# Patient Record
Sex: FEMALE | Race: WHITE | NOT HISPANIC OR LATINO | Employment: OTHER | URBAN - METROPOLITAN AREA
[De-identification: names, ages, dates, MRNs, and addresses within clinical notes are randomized per-mention and may not be internally consistent; named-entity substitution may affect disease eponyms.]

---

## 2018-03-05 ENCOUNTER — HOME CARE/HOSPICE - HEALTHEAST (OUTPATIENT)
Dept: HOME HEALTH SERVICES | Facility: HOME HEALTH | Age: 48
End: 2018-03-05

## 2018-03-07 ENCOUNTER — HOME CARE/HOSPICE - HEALTHEAST (OUTPATIENT)
Dept: HOME HEALTH SERVICES | Facility: HOME HEALTH | Age: 48
End: 2018-03-07

## 2018-03-08 ENCOUNTER — HOME CARE/HOSPICE - HEALTHEAST (OUTPATIENT)
Dept: HOME HEALTH SERVICES | Facility: HOME HEALTH | Age: 48
End: 2018-03-08

## 2018-03-21 ENCOUNTER — HOME CARE/HOSPICE - HEALTHEAST (OUTPATIENT)
Dept: HOME HEALTH SERVICES | Facility: HOME HEALTH | Age: 48
End: 2018-03-21

## 2018-12-21 ENCOUNTER — RECORDS - HEALTHEAST (OUTPATIENT)
Dept: LAB | Facility: CLINIC | Age: 48
End: 2018-12-21

## 2018-12-21 LAB
ALBUMIN SERPL-MCNC: 2.8 G/DL (ref 3.5–5)
ALP SERPL-CCNC: 55 U/L (ref 45–120)
ALT SERPL W P-5'-P-CCNC: <9 U/L (ref 0–45)
ANION GAP SERPL CALCULATED.3IONS-SCNC: 10 MMOL/L (ref 5–18)
ANION GAP SERPL CALCULATED.3IONS-SCNC: 10 MMOL/L (ref 5–18)
AST SERPL W P-5'-P-CCNC: 22 U/L (ref 0–40)
BILIRUB SERPL-MCNC: 0.3 MG/DL (ref 0–1)
BUN SERPL-MCNC: 13 MG/DL (ref 8–22)
BUN SERPL-MCNC: 13 MG/DL (ref 8–22)
CALCIUM SERPL-MCNC: 8.7 MG/DL (ref 8.5–10.5)
CALCIUM SERPL-MCNC: 8.7 MG/DL (ref 8.5–10.5)
CHLORIDE BLD-SCNC: 99 MMOL/L (ref 98–107)
CHLORIDE BLD-SCNC: 99 MMOL/L (ref 98–107)
CO2 SERPL-SCNC: 21 MMOL/L (ref 22–31)
CO2 SERPL-SCNC: 21 MMOL/L (ref 22–31)
CREAT SERPL-MCNC: 0.58 MG/DL (ref 0.6–1.1)
CREAT SERPL-MCNC: 0.58 MG/DL (ref 0.6–1.1)
GFR SERPL CREATININE-BSD FRML MDRD: >60 ML/MIN/1.73M2
GFR SERPL CREATININE-BSD FRML MDRD: >60 ML/MIN/1.73M2
GLUCOSE BLD-MCNC: 71 MG/DL (ref 70–125)
GLUCOSE BLD-MCNC: 71 MG/DL (ref 70–125)
POTASSIUM BLD-SCNC: 4.3 MMOL/L (ref 3.5–5)
POTASSIUM BLD-SCNC: 4.3 MMOL/L (ref 3.5–5)
PROT SERPL-MCNC: 6.4 G/DL (ref 6–8)
SODIUM SERPL-SCNC: 130 MMOL/L (ref 136–145)
SODIUM SERPL-SCNC: 130 MMOL/L (ref 136–145)

## 2018-12-24 LAB
SPECIMEN STATUS: ABNORMAL
VALPROATE FREE SERPL-MCNC: 26.3 UG/ML (ref 6–20)

## 2020-01-23 ENCOUNTER — RECORDS - HEALTHEAST (OUTPATIENT)
Dept: LAB | Facility: CLINIC | Age: 50
End: 2020-01-23

## 2020-01-23 LAB
ALBUMIN SERPL-MCNC: 2.6 G/DL (ref 3.5–5)
ALP SERPL-CCNC: 75 U/L (ref 45–120)
ALT SERPL W P-5'-P-CCNC: <9 U/L (ref 0–45)
ANION GAP SERPL CALCULATED.3IONS-SCNC: 9 MMOL/L (ref 5–18)
AST SERPL W P-5'-P-CCNC: 17 U/L (ref 0–40)
BILIRUB SERPL-MCNC: 0.3 MG/DL (ref 0–1)
BUN SERPL-MCNC: 17 MG/DL (ref 8–22)
CALCIUM SERPL-MCNC: 8.6 MG/DL (ref 8.5–10.5)
CHLORIDE BLD-SCNC: 98 MMOL/L (ref 98–107)
CO2 SERPL-SCNC: 23 MMOL/L (ref 22–31)
CREAT SERPL-MCNC: 0.59 MG/DL (ref 0.6–1.1)
GFR SERPL CREATININE-BSD FRML MDRD: >60 ML/MIN/1.73M2
GLUCOSE BLD-MCNC: 79 MG/DL (ref 70–125)
POTASSIUM BLD-SCNC: 4.2 MMOL/L (ref 3.5–5)
PROT SERPL-MCNC: 6.6 G/DL (ref 6–8)
SODIUM SERPL-SCNC: 130 MMOL/L (ref 136–145)
VALPROATE SERPL-MCNC: 123.5 UG/ML (ref 50–150)

## 2020-01-24 ENCOUNTER — RECORDS - HEALTHEAST (OUTPATIENT)
Dept: LAB | Facility: CLINIC | Age: 50
End: 2020-01-24

## 2020-01-27 LAB
FERRITIN SERPL-MCNC: 38 NG/ML (ref 10–130)
VIT B12 SERPL-MCNC: 1328 PG/ML (ref 213–816)

## 2020-04-29 ENCOUNTER — RECORDS - HEALTHEAST (OUTPATIENT)
Dept: ADMINISTRATIVE | Facility: OTHER | Age: 50
End: 2020-04-29

## 2020-04-30 ENCOUNTER — RECORDS - HEALTHEAST (OUTPATIENT)
Dept: ADMINISTRATIVE | Facility: OTHER | Age: 50
End: 2020-04-30

## 2020-04-30 ENCOUNTER — HOME CARE/HOSPICE - HEALTHEAST (OUTPATIENT)
Dept: HOME HEALTH SERVICES | Facility: HOME HEALTH | Age: 50
End: 2020-04-30

## 2020-05-02 ENCOUNTER — RECORDS - HEALTHEAST (OUTPATIENT)
Dept: ADMINISTRATIVE | Facility: OTHER | Age: 50
End: 2020-05-02

## 2020-05-06 ENCOUNTER — HOME CARE/HOSPICE - HEALTHEAST (OUTPATIENT)
Dept: HOME HEALTH SERVICES | Facility: HOME HEALTH | Age: 50
End: 2020-05-06

## 2020-05-07 ENCOUNTER — HOME CARE/HOSPICE - HEALTHEAST (OUTPATIENT)
Dept: HOME HEALTH SERVICES | Facility: HOME HEALTH | Age: 50
End: 2020-05-07

## 2020-05-11 ENCOUNTER — HOME CARE/HOSPICE - HEALTHEAST (OUTPATIENT)
Dept: HOME HEALTH SERVICES | Facility: HOME HEALTH | Age: 50
End: 2020-05-11

## 2020-05-11 ENCOUNTER — RECORDS - HEALTHEAST (OUTPATIENT)
Dept: LAB | Facility: CLINIC | Age: 50
End: 2020-05-11

## 2020-05-11 ENCOUNTER — RECORDS - HEALTHEAST (OUTPATIENT)
Dept: LAB | Facility: HOSPITAL | Age: 50
End: 2020-05-11

## 2020-05-11 LAB
BASOPHILS # BLD AUTO: 0 THOU/UL (ref 0–0.2)
BASOPHILS NFR BLD AUTO: 0 % (ref 0–2)
EOSINOPHIL # BLD AUTO: 0 THOU/UL (ref 0–0.4)
EOSINOPHIL NFR BLD AUTO: 0 % (ref 0–6)
ERYTHROCYTE [DISTWIDTH] IN BLOOD BY AUTOMATED COUNT: 23.7 % (ref 11–14.5)
ERYTHROCYTE [DISTWIDTH] IN BLOOD BY AUTOMATED COUNT: 23.9 % (ref 11–14.5)
HCT VFR BLD AUTO: 30.4 % (ref 35–47)
HCT VFR BLD AUTO: 30.6 % (ref 35–47)
HGB BLD-MCNC: 9.5 G/DL (ref 12–16)
HGB BLD-MCNC: 9.6 G/DL (ref 12–16)
LYMPHOCYTES # BLD AUTO: 1 THOU/UL (ref 0.8–4.4)
LYMPHOCYTES NFR BLD AUTO: 29 % (ref 20–40)
MCH RBC QN AUTO: 33.1 PG (ref 27–34)
MCH RBC QN AUTO: 33.6 PG (ref 27–34)
MCHC RBC AUTO-ENTMCNC: 31 G/DL (ref 32–36)
MCHC RBC AUTO-ENTMCNC: 31.6 G/DL (ref 32–36)
MCV RBC AUTO: 106 FL (ref 80–100)
MCV RBC AUTO: 107 FL (ref 80–100)
MONOCYTES # BLD AUTO: 0.2 THOU/UL (ref 0–0.9)
MONOCYTES NFR BLD AUTO: 5 % (ref 2–10)
NEUTROPHILS # BLD AUTO: 2.4 THOU/UL (ref 2–7.7)
NEUTROPHILS NFR BLD AUTO: 66 % (ref 50–70)
PATH REPORT.MICROSCOPIC SPEC OTHER STN: ABNORMAL
PLATELET # BLD AUTO: 137 THOU/UL (ref 140–440)
PLATELET # BLD AUTO: 148 THOU/UL (ref 140–440)
PMV BLD AUTO: 10.1 FL (ref 8.5–12.5)
PMV BLD AUTO: 10.6 FL (ref 8.5–12.5)
RBC # BLD AUTO: 2.86 MILL/UL (ref 3.8–5.4)
RBC # BLD AUTO: 2.87 MILL/UL (ref 3.8–5.4)
WBC: 3.5 THOU/UL (ref 4–11)
WBC: 3.6 THOU/UL (ref 4–11)

## 2020-05-13 ENCOUNTER — HOME CARE/HOSPICE - HEALTHEAST (OUTPATIENT)
Dept: HOME HEALTH SERVICES | Facility: HOME HEALTH | Age: 50
End: 2020-05-13

## 2020-05-15 ENCOUNTER — HOME CARE/HOSPICE - HEALTHEAST (OUTPATIENT)
Dept: HOME HEALTH SERVICES | Facility: HOME HEALTH | Age: 50
End: 2020-05-15

## 2020-05-15 LAB
LAB AP CHARGES (HE HISTORICAL CONVERSION): NORMAL
PATH REPORT.COMMENTS IMP SPEC: NORMAL
PATH REPORT.COMMENTS IMP SPEC: NORMAL
PATH REPORT.FINAL DX SPEC: NORMAL
PATH REPORT.RELEVANT HX SPEC: NORMAL

## 2020-05-18 ENCOUNTER — HOME CARE/HOSPICE - HEALTHEAST (OUTPATIENT)
Dept: HOME HEALTH SERVICES | Facility: HOME HEALTH | Age: 50
End: 2020-05-18

## 2020-05-21 ENCOUNTER — HOME CARE/HOSPICE - HEALTHEAST (OUTPATIENT)
Dept: HOME HEALTH SERVICES | Facility: HOME HEALTH | Age: 50
End: 2020-05-21

## 2020-05-22 ENCOUNTER — HOME CARE/HOSPICE - HEALTHEAST (OUTPATIENT)
Dept: HOME HEALTH SERVICES | Facility: HOME HEALTH | Age: 50
End: 2020-05-22

## 2020-05-27 ENCOUNTER — HOME CARE/HOSPICE - HEALTHEAST (OUTPATIENT)
Dept: HOME HEALTH SERVICES | Facility: HOME HEALTH | Age: 50
End: 2020-05-27

## 2020-05-28 ENCOUNTER — HOME CARE/HOSPICE - HEALTHEAST (OUTPATIENT)
Dept: HOME HEALTH SERVICES | Facility: HOME HEALTH | Age: 50
End: 2020-05-28

## 2020-05-29 ENCOUNTER — HOME CARE/HOSPICE - HEALTHEAST (OUTPATIENT)
Dept: HOME HEALTH SERVICES | Facility: HOME HEALTH | Age: 50
End: 2020-05-29

## 2020-06-01 ENCOUNTER — HOME CARE/HOSPICE - HEALTHEAST (OUTPATIENT)
Dept: HOME HEALTH SERVICES | Facility: HOME HEALTH | Age: 50
End: 2020-06-01

## 2020-06-03 ENCOUNTER — HOME CARE/HOSPICE - HEALTHEAST (OUTPATIENT)
Dept: HOME HEALTH SERVICES | Facility: HOME HEALTH | Age: 50
End: 2020-06-03

## 2020-06-05 ENCOUNTER — HOME CARE/HOSPICE - HEALTHEAST (OUTPATIENT)
Dept: HOME HEALTH SERVICES | Facility: HOME HEALTH | Age: 50
End: 2020-06-05

## 2020-06-11 ENCOUNTER — HOME CARE/HOSPICE - HEALTHEAST (OUTPATIENT)
Dept: HOME HEALTH SERVICES | Facility: HOME HEALTH | Age: 50
End: 2020-06-11

## 2020-06-12 ENCOUNTER — HOME CARE/HOSPICE - HEALTHEAST (OUTPATIENT)
Dept: HOME HEALTH SERVICES | Facility: HOME HEALTH | Age: 50
End: 2020-06-12

## 2020-06-22 ENCOUNTER — HOME CARE/HOSPICE - HEALTHEAST (OUTPATIENT)
Dept: HOME HEALTH SERVICES | Facility: HOME HEALTH | Age: 50
End: 2020-06-22

## 2021-05-27 VITALS — TEMPERATURE: 97.5 F | HEART RATE: 135 BPM | OXYGEN SATURATION: 99 %

## 2021-05-27 VITALS — TEMPERATURE: 97.9 F | OXYGEN SATURATION: 100 % | HEART RATE: 105 BPM

## 2021-05-27 VITALS — HEART RATE: 135 BPM | TEMPERATURE: 97.4 F | OXYGEN SATURATION: 95 %

## 2021-05-27 VITALS — SYSTOLIC BLOOD PRESSURE: 102 MMHG | RESPIRATION RATE: 18 BRPM | TEMPERATURE: 96.9 F | DIASTOLIC BLOOD PRESSURE: 70 MMHG

## 2021-06-04 PROBLEM — F71 MODERATE INTELLECTUAL DISABILITY: Status: ACTIVE | Noted: 2021-06-04

## 2021-06-04 PROBLEM — F91.1 CONDUCT DISORDER, CHILDHOOD-ONSET TYPE: Status: ACTIVE | Noted: 2021-06-04

## 2021-06-04 PROBLEM — G40.109 SYMPTOMATIC LOCALIZATION-RELATED EPILEPSY (H): Status: ACTIVE | Noted: 2021-06-04

## 2021-06-04 PROBLEM — R25.2 SPASTICITY: Status: ACTIVE | Noted: 2021-06-04

## 2021-06-30 SDOH — HEALTH STABILITY: MENTAL HEALTH: HOW MANY DRINKS CONTAINING ALCOHOL DO YOU HAVE ON A TYPICAL DAY WHEN YOU ARE DRINKING?: NOT ASKED

## 2021-06-30 SDOH — HEALTH STABILITY: MENTAL HEALTH: HOW OFTEN DO YOU HAVE SIX OR MORE DRINKS ON ONE OCCASION?: NOT ASKED

## 2021-06-30 SDOH — HEALTH STABILITY: MENTAL HEALTH: HOW OFTEN DO YOU HAVE A DRINK CONTAINING ALCOHOL?: NOT ASKED

## 2021-07-01 ENCOUNTER — VIRTUAL VISIT (OUTPATIENT)
Dept: NEUROLOGY | Facility: CLINIC | Age: 51
End: 2021-07-01
Payer: MEDICARE

## 2021-07-01 VITALS — HEIGHT: 57 IN | BODY MASS INDEX: 24.81 KG/M2 | WEIGHT: 115 LBS

## 2021-07-01 DIAGNOSIS — G40.109 SYMPTOMATIC LOCALIZATION-RELATED EPILEPSY (H): ICD-10-CM

## 2021-07-01 PROCEDURE — 99207 PR NO CHARGE LOS: CPT | Mod: 95 | Performed by: PSYCHIATRY & NEUROLOGY

## 2021-07-01 RX ORDER — ACETAMINOPHEN 500 MG
1000 TABLET ORAL PRN
COMMUNITY

## 2021-07-01 RX ORDER — LACOSAMIDE 150 MG/1
150 TABLET ORAL 2 TIMES DAILY
Qty: 180 TABLET | Refills: 3 | Status: SHIPPED | OUTPATIENT
Start: 2021-07-01 | End: 2021-10-18

## 2021-07-01 RX ORDER — RISPERIDONE 1 MG/1
1 TABLET ORAL 2 TIMES DAILY
COMMUNITY
Start: 2021-06-29

## 2021-07-01 RX ORDER — GABAPENTIN 300 MG/1
900 CAPSULE ORAL 3 TIMES DAILY
COMMUNITY
Start: 2021-04-04

## 2021-07-01 RX ORDER — MULTIPLE VITAMINS W/ MINERALS TAB 9MG-400MCG
1 TAB ORAL DAILY
COMMUNITY

## 2021-07-01 RX ORDER — HYDROXYZINE HYDROCHLORIDE 25 MG/1
TABLET, FILM COATED ORAL 3 TIMES DAILY
COMMUNITY
Start: 2021-06-07

## 2021-07-01 RX ORDER — LANOLIN ALCOHOL/MO/W.PET/CERES
10 CREAM (GRAM) TOPICAL AT BEDTIME
COMMUNITY

## 2021-07-01 RX ORDER — LOPERAMIDE HYDROCHLORIDE 2 MG/1
2 TABLET ORAL PRN
COMMUNITY

## 2021-07-01 RX ORDER — QUETIAPINE FUMARATE 400 MG/1
400 TABLET, FILM COATED ORAL 2 TIMES DAILY
COMMUNITY
Start: 2021-06-17

## 2021-07-01 RX ORDER — SENNOSIDES A AND B 8.6 MG/1
1 TABLET, FILM COATED ORAL 2 TIMES DAILY
COMMUNITY
End: 2022-08-19

## 2021-07-01 RX ORDER — MULTIVIT WITH MINERALS/LUTEIN
TABLET ORAL
COMMUNITY

## 2021-07-01 ASSESSMENT — MIFFLIN-ST. JEOR: SCORE: 1020.52

## 2021-07-01 NOTE — PROGRESS NOTES
[facility] Neurology  [place]    Keri Bennett MRN# [id number]   Age: 51 year old YOB: 1970               Assessment and Plan:   Assessment:   Localization-related epilepsy  developmental delay  Thrombocytopenia resolved with stopping valproic acid    Care limited by significant behavioral outbursts        Plan:   Orders Placed This Encounter   Procedures     Comprehensive metabolic panel     CBC with platelets and differential     I renewed the lacosamide prescription for the coming year.  Family will take her to [facility] for the blood draw when it is feasible (when the patient might tolerate it).  She does have an appointment with the psychiatrist tomorrow, and hopefully he has some insights about managing behaviors.             Chief Complaint/HPI:     I spoke to Queenie's sister for a follow-up visit today.  She has not had any seizures in quite a few years now, at least 15 years as I review the records available.  She has had generalized tonic-clonic seizures in the past.  Her last appointment was in May 2020.  At that time, Queenie's Depakote had been switched to lacosamide when she was found to have severe thrombocytopenia (down to 9000) at [facility].  Follow-up lab work showed platelet counts up to 140,000.  Unfortunately, since stopping the Depakote she has had quite significant behavioral issues.  She will be okay as long as things are going her way but will have significant tantrums with even minor disruptions such as trips to the bathroom.  Her clinic was even unable to get a blood pressure on her, blood draw does not seem feasible.  With these tantrums she can even show some self-injurious behavior.  On the other hand, her sister is glad that she seems more alert and interactive off of the Depakote.  Soon will be going back to a supervised work program.            Past Medical History:    has no past medical history on file.          Past Surgical History:    has no past  surgical history on file.          Social History:     Social History     Tobacco Use     Smoking status: Never Smoker     Smokeless tobacco: Never Used   Substance Use Topics     Alcohol use: Not Currently             Family History:     Family History   Problem Relation Age of Onset     Breast Cancer Mother      Lung Cancer Father                 Allergies:     Allergies   Allergen Reactions     Haldol [Haloperidol] Unknown     Lactose      Other reaction(s): GI Upset  intolerance     Phenobarbital Unknown             Medications:     Current Outpatient Medications:      acetaminophen (TYLENOL) 500 MG tablet, Take 1,000 mg by mouth as needed, Disp: , Rfl:      calcium Citrate-vitamin D 500-400 MG-UNIT CHEW, Take 1 tablet by mouth daily, Disp: , Rfl:      cholecalciferol 50 MCG (2000 UT) CAPS, Take 4,000 Units by mouth, Disp: , Rfl:      gabapentin (NEURONTIN) 300 MG capsule, Take 900 mg by mouth 3 times daily, Disp: , Rfl:      hydrOXYzine (ATARAX) 25 MG tablet, 3 times daily, Disp: , Rfl:      lacosamide (VIMPAT) 150 MG TABS tablet, Take 1 tablet (150 mg) by mouth 2 times daily, Disp: 180 tablet, Rfl: 3     loperamide (IMODIUM A-D) 2 MG tablet, Take 2 mg by mouth as needed, Disp: , Rfl:      melatonin 3 MG tablet, Take 10 mg by mouth At Bedtime, Disp: , Rfl:      multivitamin w/minerals (MULTI-VITAMIN) tablet, Take 1 tablet by mouth daily, Disp: , Rfl:      QUEtiapine (SEROQUEL) 400 MG tablet, 2 times daily, Disp: , Rfl:      risperiDONE (RISPERDAL) 0.5 MG tablet, 2 times daily, Disp: , Rfl:      senna (SENOKOT) 8.6 MG tablet, Take 1 tablet by mouth 2 times daily, Disp: , Rfl:      vitamin C (ASCORBIC ACID) 1000 MG TABS, 1 tab(s), Disp: , Rfl:               Physical Exam:     Patient's sister reports that she is awake and alert, agitated at times  The patient is basically nonverbal, no evaluation over the telephone is feasible.      Anand Rodrigues MD

## 2021-07-01 NOTE — NURSING NOTE
Chief Complaint   Patient presents with     Seizures     Annual follow up. Sister states overall she is doing well. Has had increasing behaviors. No seizures. Unable to come into clinic because of behaviors. Unable to get blood draws due to behaviors. They are working with a psychiatrist for behavior meds.     Phone visit     [phone] Tran Finn will assist with phone call.     Ara Lux LPN on 7/1/2021 at 1:57 PM

## 2021-07-01 NOTE — LETTER
7/1/2021         RE: Keri Bennett  [address]  [facility] [address]        Dear Colleague,    Thank you for referring your patient, Keri Bennett, to the [facility] NEUROLOGY CLINIC [place]. Please see a copy of my visit note below.    [facility] Neurology  [place]    eKri Bennett MRN# [id number]   Age: 51 year old YOB: 1970               Assessment and Plan:   Assessment:   Localization-related epilepsy  developmental delay  Thrombocytopenia resolved with stopping valproic acid    Care limited by significant behavioral outbursts        Plan:   Orders Placed This Encounter   Procedures     Comprehensive metabolic panel     CBC with platelets and differential     I renewed the lacosamide prescription for the coming year.  Family will take her to [facility] for the blood draw when it is feasible (when the patient might tolerate it).  She does have an appointment with the psychiatrist tomorrow, and hopefully he has some insights about managing behaviors.             Chief Complaint/HPI:     I spoke to Queenie's sister for a follow-up visit today.  She has not had any seizures in quite a few years now, at least 15 years as I review the records available.  She has had generalized tonic-clonic seizures in the past.  Her last appointment was in May 2020.  At that time, Queenie's Depakote had been switched to lacosamide when she was found to have severe thrombocytopenia (down to 9000) at [facility].  Follow-up lab work showed platelet counts up to 140,000.  Unfortunately, since stopping the Depakote she has had quite significant behavioral issues.  She will be okay as long as things are going her way but will have significant tantrums with even minor disruptions such as trips to the bathroom.  Her clinic was even unable to get a blood pressure on her, blood draw does not seem feasible.  With these tantrums she can even show some self-injurious behavior.  On the other hand, her  sister is glad that she seems more alert and interactive off of the Depakote.  Soon will be going back to a supervised work program.            Past Medical History:    has no past medical history on file.          Past Surgical History:    has no past surgical history on file.          Social History:     Social History     Tobacco Use     Smoking status: Never Smoker     Smokeless tobacco: Never Used   Substance Use Topics     Alcohol use: Not Currently             Family History:     Family History   Problem Relation Age of Onset     Breast Cancer Mother      Lung Cancer Father                 Allergies:     Allergies   Allergen Reactions     Haldol [Haloperidol] Unknown     Lactose      Other reaction(s): GI Upset  intolerance     Phenobarbital Unknown             Medications:     Current Outpatient Medications:      acetaminophen (TYLENOL) 500 MG tablet, Take 1,000 mg by mouth as needed, Disp: , Rfl:      calcium Citrate-vitamin D 500-400 MG-UNIT CHEW, Take 1 tablet by mouth daily, Disp: , Rfl:      cholecalciferol 50 MCG (2000 UT) CAPS, Take 4,000 Units by mouth, Disp: , Rfl:      gabapentin (NEURONTIN) 300 MG capsule, Take 900 mg by mouth 3 times daily, Disp: , Rfl:      hydrOXYzine (ATARAX) 25 MG tablet, 3 times daily, Disp: , Rfl:      lacosamide (VIMPAT) 150 MG TABS tablet, Take 1 tablet (150 mg) by mouth 2 times daily, Disp: 180 tablet, Rfl: 3     loperamide (IMODIUM A-D) 2 MG tablet, Take 2 mg by mouth as needed, Disp: , Rfl:      melatonin 3 MG tablet, Take 10 mg by mouth At Bedtime, Disp: , Rfl:      multivitamin w/minerals (MULTI-VITAMIN) tablet, Take 1 tablet by mouth daily, Disp: , Rfl:      QUEtiapine (SEROQUEL) 400 MG tablet, 2 times daily, Disp: , Rfl:      risperiDONE (RISPERDAL) 0.5 MG tablet, 2 times daily, Disp: , Rfl:      senna (SENOKOT) 8.6 MG tablet, Take 1 tablet by mouth 2 times daily, Disp: , Rfl:      vitamin C (ASCORBIC ACID) 1000 MG TABS, 1 tab(s), Disp: , Rfl:                Physical Exam:     Patient's sister reports that she is awake and alert, agitated at times  The patient is basically nonverbal, no evaluation over the telephone is feasible.      Anand Rodrigues MD            Again, thank you for allowing me to participate in the care of your patient.        Sincerely,        Anand Rodrigues MD

## 2021-10-18 ENCOUNTER — TELEPHONE (OUTPATIENT)
Dept: NEUROLOGY | Facility: CLINIC | Age: 51
End: 2021-10-18

## 2021-10-18 DIAGNOSIS — G40.109 SYMPTOMATIC LOCALIZATION-RELATED EPILEPSY (H): Primary | ICD-10-CM

## 2021-10-18 RX ORDER — LACOSAMIDE 200 MG/1
200 TABLET ORAL 2 TIMES DAILY
Qty: 60 TABLET | Refills: 5 | Status: SHIPPED | OUTPATIENT
Start: 2021-10-18 | End: 2022-04-14

## 2021-10-18 NOTE — TELEPHONE ENCOUNTER
Spoke with Tran and informed her of Dr. Loera's recommendations  They are out of town right now and unable to do labs immediately- They will do labs when they return from being out of town and  the rx from [facility]  Diana Strickland CMA on 10/18/2021 at 4:20 PM

## 2021-10-18 NOTE — TELEPHONE ENCOUNTER
Check Vimpat level and comprehensive metabolic panel.  Increase Vimpat to 200 mg twice daily.  I have sent a new prescription to patient's pharmacy

## 2021-10-18 NOTE — TELEPHONE ENCOUNTER
Pt's sister and POA  Tran called to report two break through seizures today. Pt was at her day program and staff witnessed 2 episodes. 1st one lasted 2 minutes, where pt was unresponsive to commands and her eyes rolled back. She was sitting at her desk. The 2nd was the same, but lasted only a minute.  Sister is out of town and pt has a PCA staying with her. Sister will check if any meds were missed. Pt hasn't had this type of episode in a very long time. Quite uncommon. I did advise Tran to get pt on a schedule to see another provider. Pt saw Dr Rodrigues in July. [phone]

## 2021-10-22 ENCOUNTER — LAB (OUTPATIENT)
Dept: LAB | Facility: CLINIC | Age: 51
End: 2021-10-22
Payer: MEDICARE

## 2021-10-22 DIAGNOSIS — G40.109 SYMPTOMATIC LOCALIZATION-RELATED EPILEPSY (H): ICD-10-CM

## 2021-10-22 LAB
ALBUMIN SERPL-MCNC: 3.5 G/DL (ref 3.5–5)
ALP SERPL-CCNC: 111 U/L (ref 45–120)
ALT SERPL W P-5'-P-CCNC: 24 U/L (ref 0–45)
ANION GAP SERPL CALCULATED.3IONS-SCNC: 11 MMOL/L (ref 5–18)
AST SERPL W P-5'-P-CCNC: 28 U/L (ref 0–40)
BASOPHILS # BLD AUTO: 0 10E3/UL (ref 0–0.2)
BASOPHILS NFR BLD AUTO: 1 %
BILIRUB SERPL-MCNC: 0.4 MG/DL (ref 0–1)
BUN SERPL-MCNC: 26 MG/DL (ref 8–22)
CALCIUM SERPL-MCNC: 9.8 MG/DL (ref 8.5–10.5)
CHLORIDE BLD-SCNC: 104 MMOL/L (ref 98–107)
CO2 SERPL-SCNC: 24 MMOL/L (ref 22–31)
CREAT SERPL-MCNC: 0.63 MG/DL (ref 0.6–1.1)
EOSINOPHIL # BLD AUTO: 0.1 10E3/UL (ref 0–0.7)
EOSINOPHIL NFR BLD AUTO: 1 %
ERYTHROCYTE [DISTWIDTH] IN BLOOD BY AUTOMATED COUNT: 15.9 % (ref 10–15)
GFR SERPL CREATININE-BSD FRML MDRD: >90 ML/MIN/1.73M2
GLUCOSE BLD-MCNC: 93 MG/DL (ref 70–125)
HCT VFR BLD AUTO: 38.2 % (ref 35–47)
HGB BLD-MCNC: 12.3 G/DL (ref 11.7–15.7)
IMM GRANULOCYTES # BLD: 0 10E3/UL
IMM GRANULOCYTES NFR BLD: 1 %
LYMPHOCYTES # BLD AUTO: 2 10E3/UL (ref 0.8–5.3)
LYMPHOCYTES NFR BLD AUTO: 31 %
MCH RBC QN AUTO: 28.7 PG (ref 26.5–33)
MCHC RBC AUTO-ENTMCNC: 32.2 G/DL (ref 31.5–36.5)
MCV RBC AUTO: 89 FL (ref 78–100)
MONOCYTES # BLD AUTO: 0.7 10E3/UL (ref 0–1.3)
MONOCYTES NFR BLD AUTO: 11 %
NEUTROPHILS # BLD AUTO: 3.5 10E3/UL (ref 1.6–8.3)
NEUTROPHILS NFR BLD AUTO: 55 %
NRBC # BLD AUTO: 0 10E3/UL
NRBC BLD AUTO-RTO: 0 /100
PLATELET # BLD AUTO: 190 10E3/UL (ref 150–450)
POTASSIUM BLD-SCNC: 4.2 MMOL/L (ref 3.5–5)
PROT SERPL-MCNC: 7 G/DL (ref 6–8)
RBC # BLD AUTO: 4.29 10E6/UL (ref 3.8–5.2)
SODIUM SERPL-SCNC: 139 MMOL/L (ref 136–145)
WBC # BLD AUTO: 6.3 10E3/UL (ref 4–11)

## 2021-10-22 PROCEDURE — 85025 COMPLETE CBC W/AUTO DIFF WBC: CPT

## 2021-10-22 PROCEDURE — 80235 DRUG ASSAY LACOSAMIDE: CPT

## 2021-10-22 PROCEDURE — 82040 ASSAY OF SERUM ALBUMIN: CPT

## 2021-10-22 PROCEDURE — 36415 COLL VENOUS BLD VENIPUNCTURE: CPT

## 2021-10-23 LAB — LACOSAMIDE SERPL-MCNC: 17 UG/ML

## 2021-10-24 NOTE — TELEPHONE ENCOUNTER
Vimpat level is elevated. I had increased the dose to 200 mg BID. If patient is tolerating that dose continue 200 mg BID. Repeat TROUGH Vimpat level. See orders

## 2021-10-26 NOTE — TELEPHONE ENCOUNTER
Spoke to Tran, relayed Dr. Loera's recommendations below.  Tran verbalizes understanding.  Pt will have lab drawn at [facility].    Ara Lux LPN on 10/26/2021 at 9:12 AM

## 2021-11-01 ENCOUNTER — LAB (OUTPATIENT)
Dept: LAB | Facility: CLINIC | Age: 51
End: 2021-11-01
Payer: MEDICARE

## 2021-11-01 DIAGNOSIS — G40.109 SYMPTOMATIC LOCALIZATION-RELATED EPILEPSY (H): ICD-10-CM

## 2021-11-01 PROCEDURE — 36415 COLL VENOUS BLD VENIPUNCTURE: CPT

## 2021-11-01 PROCEDURE — 80235 DRUG ASSAY LACOSAMIDE: CPT

## 2021-11-03 LAB — LACOSAMIDE SERPL-MCNC: 14.1 UG/ML

## 2021-11-04 NOTE — TELEPHONE ENCOUNTER
Tran informed and verbalized understanding. She will monitor Keri for the next month or 2 and if she notices increased sedation she will let us know. No further questions at this time  Diana Strickland, RAVINDER on 11/4/2021 at 2:49 PM

## 2021-11-04 NOTE — TELEPHONE ENCOUNTER
Repeat Vimpat trough level is 14.1.  This is still borderline elevated.  Currently she is on 200 mg twice daily Vimpat.  If she is tolerating this dose well no need to make any changes.  If she appears more sedated we can decrease the dose somewhat but that increases the risk of breakthrough seizures again.

## 2022-04-14 DIAGNOSIS — G40.109 SYMPTOMATIC LOCALIZATION-RELATED EPILEPSY (H): ICD-10-CM

## 2022-04-14 RX ORDER — LACOSAMIDE 200 MG/1
200 TABLET ORAL 2 TIMES DAILY
Qty: 60 TABLET | Refills: 5 | Status: SHIPPED | OUTPATIENT
Start: 2022-04-14 | End: 2022-08-19

## 2022-04-14 NOTE — TELEPHONE ENCOUNTER
Pt has a August Appt and will need refills of Vimpat 200 mg bid until she can be seen. [facility] [place]

## 2022-08-19 ENCOUNTER — OFFICE VISIT (OUTPATIENT)
Dept: NEUROLOGY | Facility: CLINIC | Age: 52
End: 2022-08-19
Payer: MEDICARE

## 2022-08-19 VITALS
DIASTOLIC BLOOD PRESSURE: 74 MMHG | BODY MASS INDEX: 25.46 KG/M2 | HEIGHT: 57 IN | HEART RATE: 105 BPM | WEIGHT: 118 LBS | SYSTOLIC BLOOD PRESSURE: 125 MMHG

## 2022-08-19 DIAGNOSIS — G40.109 SYMPTOMATIC LOCALIZATION-RELATED EPILEPSY (H): Primary | ICD-10-CM

## 2022-08-19 PROBLEM — Z98.2 VP (VENTRICULOPERITONEAL) SHUNT STATUS: Status: ACTIVE | Noted: 2017-08-16

## 2022-08-19 PROBLEM — F79 INTELLECTUAL DISABILITY: Status: ACTIVE | Noted: 2017-08-16

## 2022-08-19 PROCEDURE — 99215 OFFICE O/P EST HI 40 MIN: CPT | Performed by: PSYCHIATRY & NEUROLOGY

## 2022-08-19 RX ORDER — UBIDECARENONE 100 MG
100 CAPSULE ORAL DAILY
COMMUNITY

## 2022-08-19 RX ORDER — LACOSAMIDE 200 MG/1
200 TABLET ORAL 2 TIMES DAILY
Qty: 180 TABLET | Refills: 3 | Status: SHIPPED | OUTPATIENT
Start: 2022-08-19 | End: 2023-10-10

## 2022-08-19 NOTE — LETTER
2022         RE: Keri Bennett  [address]  [place] [address]        Dear Colleague,    Thank you for referring your patient, Keri Bennett, to the [facility] NEUROLOGY CLINIC [place]. Please see a copy of my visit note below.    NEUROLOGY FOLLOW UP VISIT  NOTE       [facility] NEUROLOGY [place]  [address] [place] [address]  Tel: [phone]  Fax: [phone]  [email or web address]     Keri Bennett,  1970, MRN [id number]  PCP: Kyle Rico  Date: 2022      ASSESSMENT & PLAN     Visit Diagnosis  1. Symptomatic localization-related epilepsy (H)     Symptomatic localization related epilepsy  52-year-old female with history of static encephalopathy,  shunt, chronic back problem who is been followed in our clinic for symptomatic localization-related epilepsy.  Previously she was on Depakote that had to be switched to Vimpat due to thrombocytopenia.  Recently she had seizure-like activity although it is not confirmed if she actually had a seizure and dose of Vimpat was increased.  I have recommended:    1.  Continue Vimpat 200 mg twice daily.  I refilled her prescription, given 90-day supply with 3 refills  2.  Check Vimpat level, gabapentin level and comprehensive metabolic panel  3.  Follow-up in 1 year, earlier if there is any problem    Thank you again for this referral, please feel free to contact me if you have any questions.    Parveen Loera MD  [facility]  (Formerly, [facility]     HISTORY OF PRESENT ILLNESS     Patient is a 52-year-old female with history of static encephalopathy,  shunt, chronic back problem and behavioral issues who is been followed in our clinic for seizures.  Previously she was followed by Dr. Anand Rodrigues and is a new patient for me.    Patient was initially seen in our clinic in  for seizures.  Initially she was on Tegretol that was switched  to Depakote but in 2020 she was admitted to [facility] for significant thrombocytopenia and was switched to Vimpat.  Her platelets did improve but she had some behavioral issues with temper tantrum.  In addition she was kept on Seroquel.  She was at work and they reported patient possibly had a seizure.  Dose of Vimpat was increased to 200 mg twice daily.  Family members are not sure if she did have a seizure.  According to them behavioral issues that started after she was taken off Depakote due to thrombocytopenia have settled down.  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    Surgical History:  She  has no past surgical history on file.     SOCIAL HISTORY     Reviewed, and she  reports that she has never smoked. She has never used smokeless tobacco. She reports previous alcohol use.     FAMILY HISTORY     Reviewed, and family history includes Breast Cancer in her mother; Lung Cancer in her father.     ALLERGIES     Allergies   Allergen Reactions     Haldol [Haloperidol] Unknown     Lactose      Other reaction(s): GI Upset  intolerance     Phenobarbital Unknown         REVIEW OF SYSTEMS     A 12 point review of system was performed and was negative except as outlined in the history of present illness.     HOME MEDICATIONS     Current Outpatient Rx   Medication Sig Dispense Refill     acetaminophen (TYLENOL) 500 MG tablet Take 1,000 mg by mouth as needed       calcium Citrate-vitamin D 500-400 MG-UNIT CHEW Take 1 tablet by mouth daily       cholecalciferol 50 MCG (2000 UT) CAPS Take 4,000 Units  "by mouth       co-enzyme Q-10 100 MG CAPS capsule Take 100 mg by mouth daily       gabapentin (NEURONTIN) 300 MG capsule Take 900 mg by mouth 3 times daily       hydrOXYzine (ATARAX) 25 MG tablet 3 times daily       lacosamide (VIMPAT) 200 MG TABS tablet Take 1 tablet (200 mg) by mouth 2 times daily 180 tablet 3     loperamide (IMODIUM A-D) 2 MG tablet Take 2 mg by mouth as needed       melatonin 3 MG tablet Take 10 mg by mouth At Bedtime       multivitamin w/minerals (THERA-VIT-M) tablet Take 1 tablet by mouth daily       QUEtiapine (SEROQUEL) 400 MG tablet Take 400 mg by mouth 2 times daily       risperiDONE (RISPERDAL) 1 MG tablet Take 1 mg by mouth 2 times daily       UNABLE TO FIND MEDICATION NAME: Mood Calm       vitamin C (ASCORBIC ACID) 1000 MG TABS 1 tab(s)           PHYSICAL EXAM     Vital signs  /74 (BP Location: Right arm, Patient Position: Sitting)   Pulse 105   Ht 1.448 m (4' 9\")   Wt 53.5 kg (118 lb)   BMI 25.53 kg/m      Weight:   118 lbs 0 oz    Patient is alert and oriented she is able to tell me her name and able to carry on simple conversation pupil equal round reactive face symmetrical moves upper extremities no movement in the lower extremity reflexes absent toes equivocal.  She did not cooperate for cerebellar testing.  She is wheelchair-bound     PERTINENT DIAGNOSTIC STUDIES     Following studies were reviewed:     CT BRAIN 5/27/2013  No change from 6/12/2012. 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   Ventricular  shunt catheter in good position on the right. Areas of encephalomalacia in the  right cerebral hemisphere as noted above.    MRI BRAIN 12/6/2010       PERTINENT LABS  Following labs were reviewed:  No visits with results within 3 Month(s) from this visit.   Latest known visit with results is:   Lab on 11/01/2021   Component Date Value     Lacosamide 11/01/2021 14.1 (A)         Total time spent for face to face visit, reviewing labs/imaging studies, counseling and " coordination of care was: 45 Minutes spent on the date of the encounter doing chart review, review of outside records, review of test results, interpretation of tests, patient visit and documentation       This note was dictated using voice recognition software.  Any grammatical or context distortions are unintentional and inherent to the software.    Orders Placed This Encounter   Procedures     Lacosamide Level     Comprehensive metabolic panel     Gabapentin Level      New Prescriptions    No medications on file     Modified Medications    Modified Medication Previous Medication    LACOSAMIDE (VIMPAT) 200 MG TABS TABLET lacosamide (VIMPAT) 200 MG TABS tablet       Take 1 tablet (200 mg) by mouth 2 times daily    Take 1 tablet (200 mg) by mouth 2 times daily                     Again, thank you for allowing me to participate in the care of your patient.        Sincerely,        Parveen Loera MD

## 2022-08-19 NOTE — PROGRESS NOTES
NEUROLOGY FOLLOW UP VISIT  NOTE       [facility] NEUROLOGY [place]  [address] [place] [address]  Tel: [phone]  Fax: [phone]  [email or web address]     Keri Bennett,  1970, MRN [id number]  PCP: Kyle Rico  Date: 2022      ASSESSMENT & PLAN     Visit Diagnosis  1. Symptomatic localization-related epilepsy (H)     Symptomatic localization related epilepsy  52-year-old female with history of static encephalopathy,  shunt, chronic back problem who is been followed in our clinic for symptomatic localization-related epilepsy.  Previously she was on Depakote that had to be switched to Vimpat due to thrombocytopenia.  Recently she had seizure-like activity although it is not confirmed if she actually had a seizure and dose of Vimpat was increased.  I have recommended:    1.  Continue Vimpat 200 mg twice daily.  I refilled her prescription, given 90-day supply with 3 refills  2.  Check Vimpat level, gabapentin level and comprehensive metabolic panel  3.  Follow-up in 1 year, earlier if there is any problem    Thank you again for this referral, please feel free to contact me if you have any questions.    Parveen Loera MD  [facility]  (Formerly, [facility]     HISTORY OF PRESENT ILLNESS     Patient is a 52-year-old female with history of static encephalopathy,  shunt, chronic back problem and behavioral issues who is been followed in our clinic for seizures.  Previously she was followed by Dr. Anand Rodrigues and is a new patient for me.    Patient was initially seen in our clinic in  for seizures.  Initially she was on Tegretol that was switched to Depakote but in  she was admitted to [facility] for significant thrombocytopenia and was switched to Vimpat.  Her platelets did improve but she had some behavioral issues with temper tantrum.  In addition she was kept on Seroquel.  She was at work  and they reported patient possibly had a seizure.  Dose of Vimpat was increased to 200 mg twice daily.  Family members are not sure if she did have a seizure.  According to them behavioral issues that started after she was taken off Depakote due to thrombocytopenia have settled down.  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    Surgical History:  She  has no past surgical history on file.     SOCIAL HISTORY     Reviewed, and she  reports that she has never smoked. She has never used smokeless tobacco. She reports previous alcohol use.     FAMILY HISTORY     Reviewed, and family history includes Breast Cancer in her mother; Lung Cancer in her father.     ALLERGIES     Allergies   Allergen Reactions     Haldol [Haloperidol] Unknown     Lactose      Other reaction(s): GI Upset  intolerance     Phenobarbital Unknown         REVIEW OF SYSTEMS     A 12 point review of system was performed and was negative except as outlined in the history of present illness.     HOME MEDICATIONS     Current Outpatient Rx   Medication Sig Dispense Refill     acetaminophen (TYLENOL) 500 MG tablet Take 1,000 mg by mouth as needed       calcium Citrate-vitamin D 500-400 MG-UNIT CHEW Take 1 tablet by mouth daily       cholecalciferol 50 MCG (2000 UT) CAPS Take 4,000 Units by mouth       co-enzyme Q-10 100 MG CAPS capsule Take 100 mg by mouth daily       gabapentin (NEURONTIN) 300 MG capsule Take 900 mg by mouth 3 times daily       hydrOXYzine (ATARAX) 25 MG tablet 3 times daily       lacosamide (VIMPAT) 200 MG TABS tablet Take 1  "tablet (200 mg) by mouth 2 times daily 180 tablet 3     loperamide (IMODIUM A-D) 2 MG tablet Take 2 mg by mouth as needed       melatonin 3 MG tablet Take 10 mg by mouth At Bedtime       multivitamin w/minerals (THERA-VIT-M) tablet Take 1 tablet by mouth daily       QUEtiapine (SEROQUEL) 400 MG tablet Take 400 mg by mouth 2 times daily       risperiDONE (RISPERDAL) 1 MG tablet Take 1 mg by mouth 2 times daily       UNABLE TO FIND MEDICATION NAME: Mood Calm       vitamin C (ASCORBIC ACID) 1000 MG TABS 1 tab(s)           PHYSICAL EXAM     Vital signs  /74 (BP Location: Right arm, Patient Position: Sitting)   Pulse 105   Ht 1.448 m (4' 9\")   Wt 53.5 kg (118 lb)   BMI 25.53 kg/m      Weight:   118 lbs 0 oz    Patient is alert and oriented she is able to tell me her name and able to carry on simple conversation pupil equal round reactive face symmetrical moves upper extremities no movement in the lower extremity reflexes absent toes equivocal.  She did not cooperate for cerebellar testing.  She is wheelchair-bound     PERTINENT DIAGNOSTIC STUDIES     Following studies were reviewed:     CT BRAIN 5/27/2013  No change from 6/12/2012. 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   Ventricular  shunt catheter in good position on the right. Areas of encephalomalacia in the  right cerebral hemisphere as noted above.    MRI BRAIN 12/6/2010       PERTINENT LABS  Following labs were reviewed:  No visits with results within 3 Month(s) from this visit.   Latest known visit with results is:   Lab on 11/01/2021   Component Date Value     Lacosamide 11/01/2021 14.1 (A)         Total time spent for face to face visit, reviewing labs/imaging studies, counseling and coordination of care was: 45 Minutes spent on the date of the encounter doing chart review, review of outside records, review of test results, interpretation of tests, patient visit and documentation       This note was dictated using voice recognition software.  Any " grammatical or context distortions are unintentional and inherent to the software.    Orders Placed This Encounter   Procedures     Lacosamide Level     Comprehensive metabolic panel     Gabapentin Level      New Prescriptions    No medications on file     Modified Medications    Modified Medication Previous Medication    LACOSAMIDE (VIMPAT) 200 MG TABS TABLET lacosamide (VIMPAT) 200 MG TABS tablet       Take 1 tablet (200 mg) by mouth 2 times daily    Take 1 tablet (200 mg) by mouth 2 times daily

## 2022-08-19 NOTE — NURSING NOTE
Chief Complaint   Patient presents with     Seizures     Annual follow up- transfer care from Dr Rodrigues. Staff reports no seizures      Diana Strickland CMA on 8/19/2022 at 9:51 AM

## 2022-12-02 ENCOUNTER — TELEPHONE (OUTPATIENT)
Dept: NEUROLOGY | Facility: CLINIC | Age: 52
End: 2022-12-02

## 2022-12-02 NOTE — TELEPHONE ENCOUNTER
Spoke to Tran (sister) that the fax number for [facility] was an error.     Tran will come over to [facility] clinic to grab the lab orders and bring it to [facility] on the day of lab appt.     Will place orders at .    Amando Ambrose 12/2/2022 11:49 AM

## 2022-12-02 NOTE — TELEPHONE ENCOUNTER
Health Call Center    Phone Message    May a detailed message be left on voicemail: yes     Reason for Call:     Tran pt's sister called states that patient is scheduled to get labs done at her PMD's office [facility] on Monday 12/5/2022 - Tran requesting for care team to fax lab orders to [facility] please so she can just get all her labs done there.     [facility] P [phone] / Fax# [phone]         Action Taken: Other: [facility]    Travel Screening: Not Applicable

## 2022-12-05 ENCOUNTER — LAB REQUISITION (OUTPATIENT)
Dept: LAB | Facility: CLINIC | Age: 52
End: 2022-12-05
Payer: MEDICARE

## 2022-12-05 DIAGNOSIS — F07.9 UNSPECIFIED PERSONALITY AND BEHAVIORAL DISORDER DUE TO KNOWN PHYSIOLOGICAL CONDITION: ICD-10-CM

## 2022-12-05 DIAGNOSIS — E55.9 VITAMIN D DEFICIENCY, UNSPECIFIED: ICD-10-CM

## 2022-12-05 DIAGNOSIS — G40.309 GENERALIZED IDIOPATHIC EPILEPSY AND EPILEPTIC SYNDROMES, NOT INTRACTABLE, WITHOUT STATUS EPILEPTICUS (H): ICD-10-CM

## 2022-12-05 DIAGNOSIS — Z13.6 ENCOUNTER FOR SCREENING FOR CARDIOVASCULAR DISORDERS: ICD-10-CM

## 2022-12-05 LAB
ALBUMIN SERPL BCG-MCNC: 4.1 G/DL (ref 3.5–5.2)
ALP SERPL-CCNC: 122 U/L (ref 35–104)
ALT SERPL W P-5'-P-CCNC: 28 U/L (ref 10–35)
ANION GAP SERPL CALCULATED.3IONS-SCNC: 15 MMOL/L (ref 7–15)
AST SERPL W P-5'-P-CCNC: 21 U/L (ref 10–35)
BILIRUB SERPL-MCNC: 0.2 MG/DL
BUN SERPL-MCNC: 14.5 MG/DL (ref 6–20)
CALCIUM SERPL-MCNC: 9.6 MG/DL (ref 8.6–10)
CHLORIDE SERPL-SCNC: 101 MMOL/L (ref 98–107)
CHOLEST SERPL-MCNC: 217 MG/DL
CREAT SERPL-MCNC: 0.51 MG/DL (ref 0.51–0.95)
DEPRECATED HCO3 PLAS-SCNC: 22 MMOL/L (ref 22–29)
GFR SERPL CREATININE-BSD FRML MDRD: >90 ML/MIN/1.73M2
GLUCOSE SERPL-MCNC: 106 MG/DL (ref 70–99)
HDLC SERPL-MCNC: 62 MG/DL
LDLC SERPL CALC-MCNC: 131 MG/DL
NONHDLC SERPL-MCNC: 155 MG/DL
POTASSIUM SERPL-SCNC: 4 MMOL/L (ref 3.4–5.3)
PROT SERPL-MCNC: 6.8 G/DL (ref 6.4–8.3)
SODIUM SERPL-SCNC: 138 MMOL/L (ref 136–145)
TRIGL SERPL-MCNC: 118 MG/DL

## 2022-12-05 PROCEDURE — 82306 VITAMIN D 25 HYDROXY: CPT | Mod: ORL | Performed by: FAMILY MEDICINE

## 2022-12-05 PROCEDURE — 80061 LIPID PANEL: CPT | Mod: ORL | Performed by: FAMILY MEDICINE

## 2022-12-05 PROCEDURE — 80235 DRUG ASSAY LACOSAMIDE: CPT | Mod: ORL | Performed by: FAMILY MEDICINE

## 2022-12-05 PROCEDURE — 80053 COMPREHEN METABOLIC PANEL: CPT | Mod: ORL | Performed by: FAMILY MEDICINE

## 2022-12-05 PROCEDURE — 80171 DRUG SCREEN QUANT GABAPENTIN: CPT | Mod: ORL | Performed by: FAMILY MEDICINE

## 2022-12-07 LAB — DEPRECATED CALCIDIOL+CALCIFEROL SERPL-MC: >155 UG/L (ref 20–75)

## 2022-12-08 LAB — GABAPENTIN SERPLBLD-MCNC: 10.2 UG/ML

## 2022-12-11 LAB — LACOSAMIDE SERPL-MCNC: 20.8 UG/ML

## 2022-12-13 ENCOUNTER — TELEPHONE (OUTPATIENT)
Dept: NEUROLOGY | Facility: CLINIC | Age: 52
End: 2022-12-13

## 2022-12-13 NOTE — TELEPHONE ENCOUNTER
[facility]    Phone Message    May a detailed message be left on voicemail: yes     Reason for Call: Requesting Results   Name/type of test: Lacosamide Level  Date of test: 12/5/22  Was test done at a location other than [facility] (Please fill in the location if not [facility])?: Yes [facility]  Please call Pt sister Tran back at [phone] to discuss.    Action Taken: Message routed to:  Other: [facility]    Travel Screening: Not Applicable

## 2022-12-15 NOTE — TELEPHONE ENCOUNTER
Returned call to patients sister (legal guardian). We discussed result letter that was mailed 12/13. She verbalizes understanding. Pt will continue vimpat and gabapentin as prescribed.     Ravi Chang RN, BSN  [facility]

## 2023-10-07 DIAGNOSIS — G40.109 SYMPTOMATIC LOCALIZATION-RELATED EPILEPSY (H): ICD-10-CM

## 2023-10-07 NOTE — LETTER
10/7/2023        RE: Keri Bennett  [address]  [facility] [address]        Dear Keri,      We recently provided you with medication refills.  Many medications require routine follow-up with your doctor. As our neurologists are booking out several months, please contact us at your earliest convenience at [phone] to avoid any interruptions in your medication refills.     Your prescription(s) have been refilled for 30 days so you may have time for the above noted follow-up. Please call to schedule soon so we can assure you have an appointment before your next refills are needed. If you have already made a follow up appointment, please disregard this letter.       Sincerely,  [facility]

## 2023-10-09 NOTE — TELEPHONE ENCOUNTER
[facility] Call Center    Phone Message    May a detailed message be left on voicemail: yes     Reason for Call: Other: Pt sister Tran is calling and stating that Pt will be out of this medication on 10/10/2023      Action Taken: Message routed to:  Other: [facility]    Travel Screening: Not Applicable

## 2023-10-10 RX ORDER — LACOSAMIDE 200 MG/1
200 TABLET ORAL 2 TIMES DAILY
Qty: 60 TABLET | Refills: 0 | Status: SHIPPED | OUTPATIENT
Start: 2023-10-10 | End: 2023-11-03

## 2023-10-10 NOTE — TELEPHONE ENCOUNTER
Refill request for: lacosamide (VIMPAT) 200 MG TABS tablet    Directions: Take 1 tablet (200 mg) by mouth 2 times daily     LOV: 8/19/22  NOV: Not scheduled- letter mailed    30 day supply with 0 refills Medication T'd for review and signature  Diana Strickland CMA on 10/10/2023 at 10:52 AM  [facility]

## 2023-10-11 ENCOUNTER — LAB REQUISITION (OUTPATIENT)
Dept: LAB | Facility: CLINIC | Age: 53
End: 2023-10-11
Payer: MEDICARE

## 2023-10-11 DIAGNOSIS — G40.309 GENERALIZED IDIOPATHIC EPILEPSY AND EPILEPTIC SYNDROMES, NOT INTRACTABLE, WITHOUT STATUS EPILEPTICUS (H): ICD-10-CM

## 2023-10-11 DIAGNOSIS — Z13.220 ENCOUNTER FOR SCREENING FOR LIPOID DISORDERS: ICD-10-CM

## 2023-10-11 DIAGNOSIS — E55.9 VITAMIN D DEFICIENCY, UNSPECIFIED: ICD-10-CM

## 2023-10-11 LAB
ALBUMIN SERPL BCG-MCNC: 3.9 G/DL (ref 3.5–5.2)
ALP SERPL-CCNC: 94 U/L (ref 35–104)
ALT SERPL W P-5'-P-CCNC: 16 U/L (ref 0–50)
ANION GAP SERPL CALCULATED.3IONS-SCNC: 13 MMOL/L (ref 7–15)
AST SERPL W P-5'-P-CCNC: 20 U/L (ref 0–45)
BILIRUB SERPL-MCNC: 0.2 MG/DL
BUN SERPL-MCNC: 27.4 MG/DL (ref 6–20)
CALCIUM SERPL-MCNC: 9.1 MG/DL (ref 8.6–10)
CHLORIDE SERPL-SCNC: 102 MMOL/L (ref 98–107)
CHOLEST SERPL-MCNC: 200 MG/DL
CREAT SERPL-MCNC: 0.53 MG/DL (ref 0.51–0.95)
DEPRECATED HCO3 PLAS-SCNC: 23 MMOL/L (ref 22–29)
EGFRCR SERPLBLD CKD-EPI 2021: >90 ML/MIN/1.73M2
GLUCOSE SERPL-MCNC: 95 MG/DL (ref 70–99)
HDLC SERPL-MCNC: 62 MG/DL
LDLC SERPL CALC-MCNC: 121 MG/DL
NONHDLC SERPL-MCNC: 138 MG/DL
POTASSIUM SERPL-SCNC: 3.7 MMOL/L (ref 3.4–5.3)
PROLACTIN SERPL 3RD IS-MCNC: 88 NG/ML (ref 5–23)
PROT SERPL-MCNC: 6.7 G/DL (ref 6.4–8.3)
SODIUM SERPL-SCNC: 138 MMOL/L (ref 135–145)
TRIGL SERPL-MCNC: 84 MG/DL
VIT D+METAB SERPL-MCNC: 97 NG/ML (ref 20–50)

## 2023-10-11 PROCEDURE — 80235 DRUG ASSAY LACOSAMIDE: CPT | Mod: ORL | Performed by: STUDENT IN AN ORGANIZED HEALTH CARE EDUCATION/TRAINING PROGRAM

## 2023-10-11 PROCEDURE — 80053 COMPREHEN METABOLIC PANEL: CPT | Mod: ORL | Performed by: STUDENT IN AN ORGANIZED HEALTH CARE EDUCATION/TRAINING PROGRAM

## 2023-10-11 PROCEDURE — 84146 ASSAY OF PROLACTIN: CPT | Mod: ORL | Performed by: STUDENT IN AN ORGANIZED HEALTH CARE EDUCATION/TRAINING PROGRAM

## 2023-10-11 PROCEDURE — 82306 VITAMIN D 25 HYDROXY: CPT | Mod: ORL | Performed by: STUDENT IN AN ORGANIZED HEALTH CARE EDUCATION/TRAINING PROGRAM

## 2023-10-11 PROCEDURE — 80061 LIPID PANEL: CPT | Mod: ORL | Performed by: STUDENT IN AN ORGANIZED HEALTH CARE EDUCATION/TRAINING PROGRAM

## 2023-10-14 LAB — LACOSAMIDE SERPL-MCNC: 12.1 UG/ML

## 2023-11-03 DIAGNOSIS — G40.109 SYMPTOMATIC LOCALIZATION-RELATED EPILEPSY (H): ICD-10-CM

## 2023-11-03 RX ORDER — LACOSAMIDE 200 MG/1
200 TABLET ORAL 2 TIMES DAILY
Qty: 60 TABLET | Refills: 0 | Status: SHIPPED | OUTPATIENT
Start: 2023-11-03 | End: 2023-11-16

## 2023-11-03 NOTE — TELEPHONE ENCOUNTER
[facility] Call Center    Phone Message    May a detailed message be left on voicemail: yes     Reason for Call: Medication Question or concern regarding medication   Prescription Clarification  Name of Medication: lacosamide (VIMPAT) 200 MG TABS tablet  Prescribing Provider:    Pharmacy: [id number] , [place] [address]   What on the order needs clarification? Patients sister Tran called states that pt will be out of medication next week, please send a refill to new pharmacy, [facility] in [place]. Patient is scheduled for a annual sz follow up with Kinjal Toney on 11-         Action Taken: Other: [facility]    Travel Screening: Not Applicable

## 2023-11-03 NOTE — TELEPHONE ENCOUNTER
LOV: 8/19/2022    NOV: 11/16/2023    Per LOV note:   I have recommended:  1.  Continue Vimpat 200 mg twice daily.        I have T'd Rx for one month supply, patient with follow-up on 11/16/2023.    Mitul Wood RN, BSN  [facility]rology

## 2023-11-15 NOTE — PROGRESS NOTES
__________________________________  ESTABLISHED PATIENT NEUROLOGY NOTE    DATE OF VISIT: 11/16/2023  MRN: [id number]  PATIENT NAME: Keri Bennett  YOB: 1970    Chief Complaint   Patient presents with    Seizures     No concerns     SUBJECTIVE:                                                      HISTORY OF PRESENT ILLNESS:  Keri is here for follow up regarding seizures    Keri Bennett is a 53 year old female with history of static encephalopathy,  shunt, chronic back problem and behavioral issues who is been followed in our clinic for seizures.  Patient follows with Dr. Loera in the clinic last seen 8/19/2022.  Per chart review, Patient was initially seen in our clinic in 2006 for seizures.  Initially she was on Tegretol that was switched to Depakote but in 2020 she was admitted to [facility] for significant thrombocytopenia and was switched to Vimpat.  Her platelets did improve but she had some behavioral issues with temper tantrum.  In addition she was kept on Seroquel.  She was at work and they reported patient possibly had a seizure.  Dose of Vimpat was increased to 200 mg twice daily.  Family members are not sure if she did have a seizure.  According to them behavioral issues that started after she was taken off Depakote due to thrombocytopenia have settled down.  She also has chronic back issues for which she takes high-dose of gabapentin     11/16/23: Keri presents to the clinic for her annual seizure follow-up.  She is accompanied by her sister Tran and her  Pasha.  These are her guardians.  Tran states that her sister has not had a generalized tonic-clonic seizure since she was approximately 11 years old.  She has some very brief absence seizures that she feels is well controlled.  Family reports that 3 years ago Tran was at work and had two zoning out spells where she was unable to respond.  At that time they increased her Vimpat.  They deny any loss of consciousness,  zoning out or staring spells recently.  She is taking her Vimpat as prescribed and tolerating well without adverse effect.  Mood has been stable.     Current Anti-Seizure Medications:    Vimpat 200 mg twice daily     Perceived AED Side Effects: No    Medication Notes:   AED Medication Compliance:  compliant     Past AEDs:    Depakote- (thrombocytopenia and mood behaviors)     Psycho-Social History: Keri Bennett currently lives [place] with sister. . Employment status: [employer]- 5 days a week    Patient does not smoke, no alcohol use, no recreational drug use.  Currently, patient denies feeling depressed, denies feeling anhedonia, denies suicidal  thoughts, and denies having feelings of excessive guilt/worthlessness.  We reviewed importance of mental and emotional wellbeing and impact on health.      Current Medications:   acetaminophen (TYLENOL) 500 MG tablet, Take 1,000 mg by mouth as needed  calcium Citrate-vitamin D 500-400 MG-UNIT CHEW, Take 1 tablet by mouth daily  cholecalciferol 50 MCG (2000 UT) CAPS, Take 4,000 Units by mouth  co-enzyme Q-10 100 MG CAPS capsule, Take 100 mg by mouth daily  gabapentin (NEURONTIN) 300 MG capsule, Take 900 mg by mouth 3 times daily  hydrOXYzine (ATARAX) 25 MG tablet, 3 times daily  loperamide (IMODIUM A-D) 2 MG tablet, Take 2 mg by mouth as needed  melatonin 3 MG tablet, Take 10 mg by mouth At Bedtime  multivitamin w/minerals (THERA-VIT-M) tablet, Take 1 tablet by mouth daily  QUEtiapine (SEROQUEL) 400 MG tablet, Take 400 mg by mouth 2 times daily  risperiDONE (RISPERDAL) 1 MG tablet, Take 1 mg by mouth 2 times daily  UNABLE TO FIND, MEDICATION NAME: Mood Calm  vitamin C (ASCORBIC ACID) 1000 MG TABS, 1 tab(s)    No current facility-administered medications on file prior to visit.    Past Medical History:   Patient  has no past medical history on file.  Surgical History:  She  has no past surgical history on file.  Family and Social History:  Reviewed, and she   "reports that she has never smoked. She has never used smokeless tobacco. She reports that she does not currently use alcohol.  Reviewed, and family history includes Breast Cancer in her mother; Lung Cancer in her father.    RECENT DIAGNOSTIC STUDIES:   Labs:10/11/23  Lacosamide  1.0 - 10.0 ug/mL 12.1 High      Imaging:   CT BRAIN 5/27/2013  No change from 6/12/2012. 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   Ventricular  shunt catheter in good position on the right. Areas of encephalomalacia in the  right cerebral hemisphere as noted above     REVIEW OF SYSTEMS:                                                      10-point review of systems is negative except as mentioned above in HPI.    EXAM:                                                      Physical Exam:   Vitals: Ht 1.448 m (4' 9\")   Wt 54.4 kg (120 lb)   BMI 25.97 kg/m    BMI= Body mass index is 25.97 kg/m .  GENERAL: NAD.  HEENT: NC/AT.  PULM: Non-labored breathing.   Neurologic:  MENTAL STATUS: Alert, attentive. Speech able to repeat short phrases.   CRANIAL NERVES: Pupils equally, round and reactive to light. Facial sensation and movement normal. Hearing intact to conversation.Tongue midline.  MOTOR: Unable to assess, family holding arms for safety  STATION AND GAIT:W/c bound     ASSESSMENT and PLAN:                                                      Assessment:    ICD-10-CM    1. Symptomatic localization-related epilepsy (H)  G40.109 lacosamide (VIMPAT) 200 MG TABS tablet        Keri Bennett is a 53 year old female with history of static encephalopathy,  shunt, chronic back problem and behavioral issues who is been followed in our clinic for seizures.  Patient follows with Dr. Loera in the clinic last seen 8/19/2022. Patient has remained well controlled on current treatment plan of Vimpat twice daily. Labs drawn in October, Vimpat level, 12.1.  We discussed interventions, will plan to see the patient back in 12 months. Tran Saavedra and Pasha " understand and agree with this plan.     Plan:  --- Continue Vimpat 200 mg twice daily   --- Labs: Vimpat level,and comprehensive metabolic panel completed in October  --- Take your medications as prescribed, and do not stop taking abruptly.  --- Try to take your anti-seizure medications at the same time every day  --- Avoid common triggers: sleep deprivation, excessive alcohol, stress, flashing lights or patterns, dehydration, recreational drug use, illness and missed medications.  --- Plan on follow up in the Neurology Clinic in 12 months.  --- Please feel free to reach out if you have any further questions or concerns.  --- Seek immediate medical attention if an emergency arises or if your health becomes progressively worse.     It was a pleasure to see you today!     Total Time: Total time spent for face to face visit, reviewing labs/imaging studies, counseling and coordination of care was: 15 Minutes spent on the date of the encounter doing chart review, review of test results, patient visit, documentation, and discussion with family     This note was dictated using voice recognition software.  Any grammatical or context distortions are unintentional and inherent to the software.    Kinjal Toney, DNP, APRN, CNP  [facility]

## 2023-11-16 ENCOUNTER — OFFICE VISIT (OUTPATIENT)
Dept: NEUROLOGY | Facility: CLINIC | Age: 53
End: 2023-11-16
Payer: MEDICARE

## 2023-11-16 VITALS — HEIGHT: 57 IN | WEIGHT: 120 LBS | BODY MASS INDEX: 25.89 KG/M2

## 2023-11-16 DIAGNOSIS — G40.109 SYMPTOMATIC LOCALIZATION-RELATED EPILEPSY (H): ICD-10-CM

## 2023-11-16 PROCEDURE — 99212 OFFICE O/P EST SF 10 MIN: CPT

## 2023-11-16 RX ORDER — LACOSAMIDE 200 MG/1
200 TABLET ORAL 2 TIMES DAILY
Qty: 180 TABLET | Refills: 3 | Status: SHIPPED | OUTPATIENT
Start: 2023-11-16

## 2023-11-16 NOTE — LETTER
11/16/2023         RE: Keri Bennett  [address]  [facility] [address]        Dear Colleague,    Thank you for referring your patient, Keri Bennett, to the [facility] [place]. Please see a copy of my visit note below.      __________________________________  ESTABLISHED PATIENT NEUROLOGY NOTE    DATE OF VISIT: 11/16/2023  MRN: [id number]  PATIENT NAME: Keri Bennett  YOB: 1970    Chief Complaint   Patient presents with     Seizures     No concerns     SUBJECTIVE:                                                      HISTORY OF PRESENT ILLNESS:  Keri is here for follow up regarding seizures    Keri Bennett is a 53 year old female with history of static encephalopathy,  shunt, chronic back problem and behavioral issues who is been followed in our clinic for seizures.  Patient follows with Dr. Loera in the clinic last seen 8/19/2022.  Per chart review, Patient was initially seen in our clinic in 2006 for seizures.  Initially she was on Tegretol that was switched to Depakote but in 2020 she was admitted to [facility] for significant thrombocytopenia and was switched to Vimpat.  Her platelets did improve but she had some behavioral issues with temper tantrum.  In addition she was kept on Seroquel.  She was at work and they reported patient possibly had a seizure.  Dose of Vimpat was increased to 200 mg twice daily.  Family members are not sure if she did have a seizure.  According to them behavioral issues that started after she was taken off Depakote due to thrombocytopenia have settled down.  She also has chronic back issues for which she takes high-dose of gabapentin     11/16/23: Keri presents to the clinic for her annual seizure follow-up.  She is accompanied by her sister Tran and her  Pasha.  These are her guardians.  Tran states that her sister has not had a generalized tonic-clonic seizure since she was approximately 11 years old.  She has  some very brief absence seizures that she feels is well controlled.  Family reports that 3 years ago Tran was at work and had two zoning out spells where she was unable to respond.  At that time they increased her Vimpat.  They deny any loss of consciousness, zoning out or staring spells recently.  She is taking her Vimpat as prescribed and tolerating well without adverse effect.  Mood has been stable.     Current Anti-Seizure Medications:    Vimpat 200 mg twice daily     Perceived AED Side Effects: No    Medication Notes:   AED Medication Compliance:  compliant     Past AEDs:    Depakote- (thrombocytopenia and mood behaviors)     Psycho-Social History: Keri Bennett currently lives [place] with sister. . Employment status: [employer]- 5 days a week    Patient does not smoke, no alcohol use, no recreational drug use.  Currently, patient denies feeling depressed, denies feeling anhedonia, denies suicidal  thoughts, and denies having feelings of excessive guilt/worthlessness.  We reviewed importance of mental and emotional wellbeing and impact on health.      Current Medications:   acetaminophen (TYLENOL) 500 MG tablet, Take 1,000 mg by mouth as needed  calcium Citrate-vitamin D 500-400 MG-UNIT CHEW, Take 1 tablet by mouth daily  cholecalciferol 50 MCG (2000 UT) CAPS, Take 4,000 Units by mouth  co-enzyme Q-10 100 MG CAPS capsule, Take 100 mg by mouth daily  gabapentin (NEURONTIN) 300 MG capsule, Take 900 mg by mouth 3 times daily  hydrOXYzine (ATARAX) 25 MG tablet, 3 times daily  loperamide (IMODIUM A-D) 2 MG tablet, Take 2 mg by mouth as needed  melatonin 3 MG tablet, Take 10 mg by mouth At Bedtime  multivitamin w/minerals (THERA-VIT-M) tablet, Take 1 tablet by mouth daily  QUEtiapine (SEROQUEL) 400 MG tablet, Take 400 mg by mouth 2 times daily  risperiDONE (RISPERDAL) 1 MG tablet, Take 1 mg by mouth 2 times daily  UNABLE TO FIND, MEDICATION NAME: Mood Calm  vitamin C (ASCORBIC ACID) 1000 MG TABS, 1  "tab(s)    No current facility-administered medications on file prior to visit.    Past Medical History:   Patient  has no past medical history on file.  Surgical History:  She  has no past surgical history on file.  Family and Social History:  Reviewed, and she  reports that she has never smoked. She has never used smokeless tobacco. She reports that she does not currently use alcohol.  Reviewed, and family history includes Breast Cancer in her mother; Lung Cancer in her father.    RECENT DIAGNOSTIC STUDIES:   Labs:10/11/23  Lacosamide  1.0 - 10.0 ug/mL 12.1 High      Imaging:   CT BRAIN 5/27/2013  No change from 6/12/2012. 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   Ventricular  shunt catheter in good position on the right. Areas of encephalomalacia in the  right cerebral hemisphere as noted above     REVIEW OF SYSTEMS:                                                      10-point review of systems is negative except as mentioned above in HPI.    EXAM:                                                      Physical Exam:   Vitals: Ht 1.448 m (4' 9\")   Wt 54.4 kg (120 lb)   BMI 25.97 kg/m    BMI= Body mass index is 25.97 kg/m .  GENERAL: NAD.  HEENT: NC/AT.  PULM: Non-labored breathing.   Neurologic:  MENTAL STATUS: Alert, attentive. Speech able to repeat short phrases.   CRANIAL NERVES: Pupils equally, round and reactive to light. Facial sensation and movement normal. Hearing intact to conversation.Tongue midline.  MOTOR: Unable to assess, family holding arms for safety  STATION AND GAIT:W/c bound     ASSESSMENT and PLAN:                                                      Assessment:    ICD-10-CM    1. Symptomatic localization-related epilepsy (H)  G40.109 lacosamide (VIMPAT) 200 MG TABS tablet        Keri Bennett is a 53 year old female with history of static encephalopathy,  shunt, chronic back problem and behavioral issues who is been followed in our clinic for seizures.  Patient follows with Dr. Loera in " the clinic last seen 8/19/2022. Patient has remained well controlled on current treatment plan of Vimpat twice daily. Labs drawn in October, Vimpat level, 12.1.  We discussed interventions, will plan to see the patient back in 12 months. Tran Saavedra and Pasha understand and agree with this plan.     Plan:  --- Continue Vimpat 200 mg twice daily   --- Labs: Vimpat level,and comprehensive metabolic panel completed in October  --- Take your medications as prescribed, and do not stop taking abruptly.  --- Try to take your anti-seizure medications at the same time every day  --- Avoid common triggers: sleep deprivation, excessive alcohol, stress, flashing lights or patterns, dehydration, recreational drug use, illness and missed medications.  --- Plan on follow up in the Neurology Clinic in 12 months.  --- Please feel free to reach out if you have any further questions or concerns.  --- Seek immediate medical attention if an emergency arises or if your health becomes progressively worse.     It was a pleasure to see you today!     Total Time: Total time spent for face to face visit, reviewing labs/imaging studies, counseling and coordination of care was: 15 Minutes spent on the date of the encounter doing chart review, review of test results, patient visit, documentation, and discussion with family     This note was dictated using voice recognition software.  Any grammatical or context distortions are unintentional and inherent to the software.    Kinjal Toney, ROMÁN, APRN, CNP  [facility]                     Again, thank you for allowing me to participate in the care of your patient.        Sincerely,        TANNA Garcia CNP

## 2023-11-16 NOTE — PATIENT INSTRUCTIONS
Plan:  --- Continue Vimpat 200 mg twice daily   --- Labs: Vimpat level,and comprehensive metabolic panel completed in October  --- Take your medications as prescribed, and do not stop taking abruptly.  --- Try to take your anti-seizure medications at the same time every day  --- Avoid common triggers: sleep deprivation, excessive alcohol, stress, flashing lights or patterns, dehydration, recreational drug use, illness and missed medications.  --- Plan on follow up in the Neurology Clinic in 12 months.  --- Please feel free to reach out if you have any further questions or concerns.  --- Seek immediate medical attention if an emergency arises or if your health becomes progressively worse.     It was a pleasure to see you today!

## 2023-11-16 NOTE — NURSING NOTE
Chief Complaint   Patient presents with    Seizures     No concerns     Nohemy Echavarria on 11/16/2023 at 8:07 AM

## 2024-11-12 NOTE — PROGRESS NOTES
__________________________________  ESTABLISHED PATIENT NEUROLOGY NOTE    DATE OF VISIT: 11/16/2023  MRN: [id number]  PATIENT NAME: Keri Bennett  YOB: 1970    Chief Complaint   Patient presents with    Seizures     Sister states that patient is having daily little seizures. No other concerns.     SUBJECTIVE:                                                      HISTORY OF PRESENT ILLNESS:  Keri is here for follow up regarding seizures    Keri Bennett is a 53 year old female with history of static encephalopathy,  shunt, chronic back problem and behavioral issues who is been followed in our clinic for seizures.  Patient follows with Dr. Loera in the clinic last seen 8/19/2022.  Per chart review, Patient was initially seen in our clinic in 2006 for seizures.  Initially she was on Tegretol that was switched to Depakote but in 2020 she was admitted to [facility] for significant thrombocytopenia and was switched to Vimpat.  Her platelets did improve but she had some behavioral issues with temper tantrum.  In addition she was kept on Seroquel.  She was at work and they reported patient possibly had a seizure.  Dose of Vimpat was increased to 200 mg twice daily.  Family members are not sure if she did have a seizure.  According to them behavioral issues that started after she was taken off Depakote due to thrombocytopenia have settled down.  She also has chronic back issues for which she takes high-dose of gabapentin     11/16/23: Keri presents to the clinic for her annual seizure follow-up.  She is accompanied by her sister Tran and her  Pasha.  These are her guardians.  Tran states that her sister has not had a generalized tonic-clonic seizure since she was approximately 11 years old.  She has some very brief absence seizures that she feels is well controlled.  Family reports that 3 years ago Tran was at work and had two zoning out spells where she was unable to respond.  At that time  they increased her Vimpat.  They deny any loss of consciousness, zoning out or staring spells recently.  She is taking her Vimpat as prescribed and tolerating well without adverse effect.  Mood has been stable.    11/14/24: Keri presents to the clinic for her annual seizure follow-up.  She is accompanied by her sister Tran and her  Pasha.  Tran and Jerad reports that Keri's seizures remain well-controlled.  They states that she is doing great.  She may have brief zoning out spells that remained stable.  No loss of consciousness noted.  No involuntary muscle movement.  She is currently taking Vimpat 200 mg twice daily and gabapentin 900 mg 3 times daily.  She is tolerating her medications well.  Tran reports that she takes gabapentin for generalized body pain.  She has tried to wean her down and then patient complains of leg pain.  No additional questions or concerns     Current Anti-Seizure Medications:    Vimpat 200 mg twice daily     Perceived AED Side Effects: No    Medication Notes:   AED Medication Compliance:  compliant     Past AEDs:    Depakote- (thrombocytopenia and mood behaviors)     Psycho-Social History: Keri Bennett currently lives [place] with sister.  Employment status: [employer]- 5 days a week    Patient does not smoke, no alcohol use, no recreational drug use.  Currently, patient denies feeling depressed, denies feeling anhedonia, denies suicidal  thoughts, and denies having feelings of excessive guilt/worthlessness.  We reviewed importance of mental and emotional wellbeing and impact on health.      Current Medications:   Current Outpatient Medications   Medication Sig Dispense Refill    acetaminophen (TYLENOL) 500 MG tablet Take 1,000 mg by mouth as needed      calcium Citrate-vitamin D 500-400 MG-UNIT CHEW Take 1 tablet by mouth daily      cholecalciferol 50 MCG (2000 UT) CAPS Take 4,000 Units by mouth      co-enzyme Q-10 100 MG CAPS capsule Take 100 mg by mouth daily       gabapentin (NEURONTIN) 300 MG capsule Take 900 mg by mouth 3 times daily      hydrOXYzine (ATARAX) 25 MG tablet 3 times daily      lacosamide (VIMPAT) 200 MG TABS tablet Take 1 tablet (200 mg) by mouth 2 times daily. 180 tablet 3    loperamide (IMODIUM A-D) 2 MG tablet Take 2 mg by mouth as needed      multivitamin w/minerals (THERA-VIT-M) tablet Take 1 tablet by mouth daily      QUEtiapine (SEROQUEL) 400 MG tablet Take 400 mg by mouth 2 times daily      risperiDONE (RISPERDAL) 1 MG tablet Take 1 mg by mouth 2 times daily      vitamin C (ASCORBIC ACID) 1000 MG TABS 1 tab(s)      melatonin 3 MG tablet Take 10 mg by mouth At Bedtime (Patient not taking: Reported on 11/14/2024)      UNABLE TO FIND MEDICATION NAME: Mood Calm       No current facility-administered medications for this visit.     Past Medical History:   Patient  has no past medical history on file.  Surgical History:  She  has no past surgical history on file.  Family and Social History:  Reviewed, and she  reports that she has never smoked. She has never used smokeless tobacco. She reports that she does not currently use alcohol.  Reviewed, and family history includes Breast Cancer in her mother; Lung Cancer in her father.    RECENT DIAGNOSTIC STUDIES:   Labs:10/11/23  Lacosamide  1.0 - 10.0 ug/mL 12.1 High      Imaging:   CT BRAIN 5/27/2013  No change from 6/12/2012. 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   Ventricular  shunt catheter in good position on the right. Areas of encephalomalacia in the  right cerebral hemisphere as noted above     REVIEW OF SYSTEMS:                                                      10-point review of systems is negative except as mentioned above in HPI.    EXAM:                                                      Physical Exam:   Vitals: /74   Pulse 92   Wt 54.4 kg (120 lb)   BMI 25.97 kg/m    BMI= Body mass index is 25.97 kg/m .  GENERAL: NAD.  HEENT: NC/AT.  PULM: Non-labored breathing.    Neurologic:  MENTAL STATUS: Alert, attentive. Speech able to repeat short phrases.   CRANIAL NERVES: Pupils equally, round and reactive to light. Facial sensation and movement normal. Hearing intact to conversation.Tongue midline.  MOTOR: able to move upper extremities.   STATION AND GAIT:W/c bound     ASSESSMENT and PLAN:                                                      Assessment:    ICD-10-CM    1. Symptomatic localization-related epilepsy (H)  G40.109 lacosamide (VIMPAT) 200 MG TABS tablet     Lacosamide Level     Comprehensive metabolic panel          Keri Bennett is a 54 year old female with history of static encephalopathy,  shunt, chronic back problem and behavioral issues who is been followed in our clinic for seizures.  Patient follows with Dr. Loera in the clinic. Patient has remained well controlled on current treatment plan of Vimpat 200 mg twice daily.  I ordered labs to monitor drug parameters.  We discussed interventions, will plan to see the patient back in 12 months. Tran Saavedra and Pasha understand and agree with this plan.     Plan:  --- Continue Vimpat 200 mg twice daily   --- Labs: Vimpat level,and comprehensive metabolic panel completed in October  --- Take your medications as prescribed, and do not stop taking abruptly.  --- Try to take your anti-seizure medications at the same time every day  --- Avoid common triggers: sleep deprivation, excessive alcohol, stress, flashing lights or patterns, dehydration, recreational drug use, illness and missed medications.  --- Plan on follow up in the Neurology Clinic in 12 months with Dr. Loera.  --- Please feel free to reach out if you have any further questions or concerns.  --- Seek immediate medical attention if an emergency arises or if your health becomes progressively worse.     It was a pleasure to see you today!     Total Time: Total time spent for face to face visit, reviewing labs/imaging studies, counseling and coordination of care  was: 15 Minutes spent on the date of the encounter doing chart review, review of test results, patient visit, documentation, and discussion with family     This note was dictated using voice recognition software.  Any grammatical or context distortions are unintentional and inherent to the software.    Kinjal Toney, DNP, APRN, CNP  [facility]

## 2024-11-14 ENCOUNTER — OFFICE VISIT (OUTPATIENT)
Dept: NEUROLOGY | Facility: CLINIC | Age: 54
End: 2024-11-14
Payer: MEDICARE

## 2024-11-14 VITALS
DIASTOLIC BLOOD PRESSURE: 74 MMHG | WEIGHT: 120 LBS | SYSTOLIC BLOOD PRESSURE: 112 MMHG | BODY MASS INDEX: 25.97 KG/M2 | HEART RATE: 92 BPM

## 2024-11-14 DIAGNOSIS — G40.109 SYMPTOMATIC LOCALIZATION-RELATED EPILEPSY (H): ICD-10-CM

## 2024-11-14 PROCEDURE — 99212 OFFICE O/P EST SF 10 MIN: CPT

## 2024-11-14 RX ORDER — LACOSAMIDE 200 MG/1
200 TABLET ORAL 2 TIMES DAILY
Qty: 180 TABLET | Refills: 3 | Status: SHIPPED | OUTPATIENT
Start: 2024-11-14

## 2024-11-14 NOTE — NURSING NOTE
Chief Complaint   Patient presents with    Seizures     Sister states that patient is having daily little seizures. No other concerns.     Nohemy Echavarria on 11/14/2024 at 9:03 AM

## 2024-11-14 NOTE — PATIENT INSTRUCTIONS
Plan:  --- Continue Vimpat 200 mg twice daily   --- Labs: Vimpat level,and comprehensive metabolic panel completed in October  --- Take your medications as prescribed, and do not stop taking abruptly.  --- Try to take your anti-seizure medications at the same time every day  --- Avoid common triggers: sleep deprivation, excessive alcohol, stress, flashing lights or patterns, dehydration, recreational drug use, illness and missed medications.  --- Plan on follow up in the Neurology Clinic in 12 months with Dr. Loera.  --- Please feel free to reach out if you have any further questions or concerns.  --- Seek immediate medical attention if an emergency arises or if your health becomes progressively worse.     It was a pleasure to see you today!

## 2024-11-14 NOTE — LETTER
11/14/2024      Keri Bennett  [address]  [facility] [address]      Dear Colleague,    Thank you for referring your patient, Keri Bennett, to the [facility] [place]. Please see a copy of my visit note below.      __________________________________  ESTABLISHED PATIENT NEUROLOGY NOTE    DATE OF VISIT: 11/16/2023  MRN: [id number]  PATIENT NAME: Keri Bennett  YOB: 1970    Chief Complaint   Patient presents with     Seizures     Sister states that patient is having daily little seizures. No other concerns.     SUBJECTIVE:                                                      HISTORY OF PRESENT ILLNESS:  Keri is here for follow up regarding seizures    Keri Bennett is a 53 year old female with history of static encephalopathy,  shunt, chronic back problem and behavioral issues who is been followed in our clinic for seizures.  Patient follows with Dr. Loera in the clinic last seen 8/19/2022.  Per chart review, Patient was initially seen in our clinic in 2006 for seizures.  Initially she was on Tegretol that was switched to Depakote but in 2020 she was admitted to [facility] for significant thrombocytopenia and was switched to Vimpat.  Her platelets did improve but she had some behavioral issues with temper tantrum.  In addition she was kept on Seroquel.  She was at work and they reported patient possibly had a seizure.  Dose of Vimpat was increased to 200 mg twice daily.  Family members are not sure if she did have a seizure.  According to them behavioral issues that started after she was taken off Depakote due to thrombocytopenia have settled down.  She also has chronic back issues for which she takes high-dose of gabapentin     11/16/23: Keri presents to the clinic for her annual seizure follow-up.  She is accompanied by her sister Tran and her  Pasha.  These are her guardians.  Tran states that her sister has not had a generalized tonic-clonic  seizure since she was approximately 11 years old.  She has some very brief absence seizures that she feels is well controlled.  Family reports that 3 years ago Tran was at work and had two zoning out spells where she was unable to respond.  At that time they increased her Vimpat.  They deny any loss of consciousness, zoning out or staring spells recently.  She is taking her Vimpat as prescribed and tolerating well without adverse effect.  Mood has been stable.    11/14/24: Keri presents to the clinic for her annual seizure follow-up.  She is accompanied by her sister Tran and her  Pasha.  Tran and Jerad reports that Keri's seizures remain well-controlled.  They states that she is doing great.  She may have brief zoning out spells that remained stable.  No loss of consciousness noted.  No involuntary muscle movement.  She is currently taking Vimpat 200 mg twice daily and gabapentin 900 mg 3 times daily.  She is tolerating her medications well.  Tran reports that she takes gabapentin for generalized body pain.  She has tried to wean her down and then patient complains of leg pain.  No additional questions or concerns     Current Anti-Seizure Medications:    Vimpat 200 mg twice daily     Perceived AED Side Effects: No    Medication Notes:   AED Medication Compliance:  compliant     Past AEDs:    Depakote- (thrombocytopenia and mood behaviors)     Psycho-Social History: Keri Bennett currently lives [place] with sister.  Employment status: [employer]- 5 days a week    Patient does not smoke, no alcohol use, no recreational drug use.  Currently, patient denies feeling depressed, denies feeling anhedonia, denies suicidal  thoughts, and denies having feelings of excessive guilt/worthlessness.  We reviewed importance of mental and emotional wellbeing and impact on health.      Current Medications:   Current Outpatient Medications   Medication Sig Dispense Refill     acetaminophen (TYLENOL) 500 MG  tablet Take 1,000 mg by mouth as needed       calcium Citrate-vitamin D 500-400 MG-UNIT CHEW Take 1 tablet by mouth daily       cholecalciferol 50 MCG (2000 UT) CAPS Take 4,000 Units by mouth       co-enzyme Q-10 100 MG CAPS capsule Take 100 mg by mouth daily       gabapentin (NEURONTIN) 300 MG capsule Take 900 mg by mouth 3 times daily       hydrOXYzine (ATARAX) 25 MG tablet 3 times daily       lacosamide (VIMPAT) 200 MG TABS tablet Take 1 tablet (200 mg) by mouth 2 times daily. 180 tablet 3     loperamide (IMODIUM A-D) 2 MG tablet Take 2 mg by mouth as needed       multivitamin w/minerals (THERA-VIT-M) tablet Take 1 tablet by mouth daily       QUEtiapine (SEROQUEL) 400 MG tablet Take 400 mg by mouth 2 times daily       risperiDONE (RISPERDAL) 1 MG tablet Take 1 mg by mouth 2 times daily       vitamin C (ASCORBIC ACID) 1000 MG TABS 1 tab(s)       melatonin 3 MG tablet Take 10 mg by mouth At Bedtime (Patient not taking: Reported on 11/14/2024)       UNABLE TO FIND MEDICATION NAME: Mood Calm       No current facility-administered medications for this visit.     Past Medical History:   Patient  has no past medical history on file.  Surgical History:  She  has no past surgical history on file.  Family and Social History:  Reviewed, and she  reports that she has never smoked. She has never used smokeless tobacco. She reports that she does not currently use alcohol.  Reviewed, and family history includes Breast Cancer in her mother; Lung Cancer in her father.    RECENT DIAGNOSTIC STUDIES:   Labs:10/11/23  Lacosamide  1.0 - 10.0 ug/mL 12.1 High      Imaging:   CT BRAIN 5/27/2013  No change from 6/12/2012. 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   Ventricular  shunt catheter in good position on the right. Areas of encephalomalacia in the  right cerebral hemisphere as noted above     REVIEW OF SYSTEMS:                                                      10-point review of systems is negative except as mentioned above in  HPI.    EXAM:                                                      Physical Exam:   Vitals: /74   Pulse 92   Wt 54.4 kg (120 lb)   BMI 25.97 kg/m    BMI= Body mass index is 25.97 kg/m .  GENERAL: NAD.  HEENT: NC/AT.  PULM: Non-labored breathing.   Neurologic:  MENTAL STATUS: Alert, attentive. Speech able to repeat short phrases.   CRANIAL NERVES: Pupils equally, round and reactive to light. Facial sensation and movement normal. Hearing intact to conversation.Tongue midline.  MOTOR: able to move upper extremities.   STATION AND GAIT:W/c bound     ASSESSMENT and PLAN:                                                      Assessment:    ICD-10-CM    1. Symptomatic localization-related epilepsy (H)  G40.109 lacosamide (VIMPAT) 200 MG TABS tablet     Lacosamide Level     Comprehensive metabolic panel          Keri Bennett is a 54 year old female with history of static encephalopathy,  shunt, chronic back problem and behavioral issues who is been followed in our clinic for seizures.  Patient follows with Dr. Loera in the clinic. Patient has remained well controlled on current treatment plan of Vimpat 200 mg twice daily.  I ordered labs to monitor drug parameters.  We discussed interventions, will plan to see the patient back in 12 months. Keri Tran and Pasha understand and agree with this plan.     Plan:  --- Continue Vimpat 200 mg twice daily   --- Labs: Vimpat level,and comprehensive metabolic panel completed in October  --- Take your medications as prescribed, and do not stop taking abruptly.  --- Try to take your anti-seizure medications at the same time every day  --- Avoid common triggers: sleep deprivation, excessive alcohol, stress, flashing lights or patterns, dehydration, recreational drug use, illness and missed medications.  --- Plan on follow up in the Neurology Clinic in 12 months with Dr. Loera.  --- Please feel free to reach out if you have any further questions or concerns.  --- Seek  immediate medical attention if an emergency arises or if your health becomes progressively worse.     It was a pleasure to see you today!     Total Time: Total time spent for face to face visit, reviewing labs/imaging studies, counseling and coordination of care was: 15 Minutes spent on the date of the encounter doing chart review, review of test results, patient visit, documentation, and discussion with family     This note was dictated using voice recognition software.  Any grammatical or context distortions are unintentional and inherent to the software.    Kinjal Toney, ROMÁN, APRN, CNP  [facility]                     Again, thank you for allowing me to participate in the care of your patient.        Sincerely,        TANNA Garcia CNP

## 2024-12-03 ENCOUNTER — LAB REQUISITION (OUTPATIENT)
Dept: LAB | Facility: CLINIC | Age: 54
End: 2024-12-03
Payer: MEDICARE

## 2024-12-03 DIAGNOSIS — Z13.220 ENCOUNTER FOR SCREENING FOR LIPOID DISORDERS: ICD-10-CM

## 2024-12-03 DIAGNOSIS — E55.9 VITAMIN D DEFICIENCY, UNSPECIFIED: ICD-10-CM

## 2024-12-03 DIAGNOSIS — G40.309 GENERALIZED IDIOPATHIC EPILEPSY AND EPILEPTIC SYNDROMES, NOT INTRACTABLE, WITHOUT STATUS EPILEPTICUS (H): ICD-10-CM

## 2024-12-03 PROCEDURE — 82306 VITAMIN D 25 HYDROXY: CPT | Mod: ORL | Performed by: STUDENT IN AN ORGANIZED HEALTH CARE EDUCATION/TRAINING PROGRAM

## 2024-12-03 PROCEDURE — 80053 COMPREHEN METABOLIC PANEL: CPT | Mod: ORL | Performed by: STUDENT IN AN ORGANIZED HEALTH CARE EDUCATION/TRAINING PROGRAM

## 2024-12-03 PROCEDURE — 80061 LIPID PANEL: CPT | Mod: ORL | Performed by: STUDENT IN AN ORGANIZED HEALTH CARE EDUCATION/TRAINING PROGRAM

## 2024-12-03 PROCEDURE — 80235 DRUG ASSAY LACOSAMIDE: CPT | Mod: ORL | Performed by: STUDENT IN AN ORGANIZED HEALTH CARE EDUCATION/TRAINING PROGRAM

## 2024-12-04 LAB
ALBUMIN SERPL BCG-MCNC: 4 G/DL (ref 3.5–5.2)
ALP SERPL-CCNC: 115 U/L (ref 40–150)
ALT SERPL W P-5'-P-CCNC: 16 U/L (ref 0–50)
ANION GAP SERPL CALCULATED.3IONS-SCNC: 12 MMOL/L (ref 7–15)
AST SERPL W P-5'-P-CCNC: 23 U/L (ref 0–45)
BILIRUB SERPL-MCNC: 0.2 MG/DL
BUN SERPL-MCNC: 14.8 MG/DL (ref 6–20)
CALCIUM SERPL-MCNC: 9.5 MG/DL (ref 8.8–10.4)
CHLORIDE SERPL-SCNC: 103 MMOL/L (ref 98–107)
CHOLEST SERPL-MCNC: 212 MG/DL
CREAT SERPL-MCNC: 0.56 MG/DL (ref 0.51–0.95)
EGFRCR SERPLBLD CKD-EPI 2021: >90 ML/MIN/1.73M2
FASTING STATUS PATIENT QL REPORTED: ABNORMAL
FASTING STATUS PATIENT QL REPORTED: NORMAL
GLUCOSE SERPL-MCNC: 87 MG/DL (ref 70–99)
HCO3 SERPL-SCNC: 24 MMOL/L (ref 22–29)
HDLC SERPL-MCNC: 65 MG/DL
LDLC SERPL CALC-MCNC: 131 MG/DL
NONHDLC SERPL-MCNC: 147 MG/DL
POTASSIUM SERPL-SCNC: 3.8 MMOL/L (ref 3.4–5.3)
PROT SERPL-MCNC: 6.8 G/DL (ref 6.4–8.3)
SODIUM SERPL-SCNC: 139 MMOL/L (ref 135–145)
TRIGL SERPL-MCNC: 80 MG/DL
VIT D+METAB SERPL-MCNC: 85 NG/ML (ref 20–50)

## 2024-12-05 LAB — LACOSAMIDE SERPL-MCNC: 12.9 UG/ML
